# Patient Record
(demographics unavailable — no encounter records)

---

## 2024-11-14 NOTE — HISTORY OF PRESENT ILLNESS
[FreeTextEntry1] : ADITYA DICKEY is a 30 year old male to female transgender patient with a history of gender dysphoria.  She is s/p FFS surgery 1/22/24 Facial feminization with,  1.  Hairline advancement, forehead reduction. 15824 2.  Supraorbital rim reduction.  21256 3.  Lateral orbital rim osteotomy and temporal reduction. 21172 4.  Frontal sinus setback. 21139 5.  Bilateral brow lift. 75841 6.  Fat grafting to the zygoma. 15773 7.  Osseous genioplasty. 21122 8.  Open septorhinoplasty. 53608 8.  Tracheal shave. 24142  She is almost 1 year s/p post ffs and feeling well

## 2024-11-14 NOTE — HISTORY OF PRESENT ILLNESS
[FreeTextEntry1] : ADITYA DICKEY is a 30 year old male to female transgender patient with a history of gender dysphoria.  She is s/p FFS surgery 1/22/24 Facial feminization with,  1.  Hairline advancement, forehead reduction. 15824 2.  Supraorbital rim reduction.  21256 3.  Lateral orbital rim osteotomy and temporal reduction. 21172 4.  Frontal sinus setback. 21139 5.  Bilateral brow lift. 63042 6.  Fat grafting to the zygoma. 15773 7.  Osseous genioplasty. 21122 8.  Open septorhinoplasty. 25525 8.  Tracheal shave. 59281  She is almost 1 year s/p post ffs and feeling well